# Patient Record
Sex: MALE | Race: WHITE
[De-identification: names, ages, dates, MRNs, and addresses within clinical notes are randomized per-mention and may not be internally consistent; named-entity substitution may affect disease eponyms.]

---

## 2020-02-12 ENCOUNTER — HOSPITAL ENCOUNTER (OUTPATIENT)
Dept: HOSPITAL 47 - RADMRIMAIN | Age: 75
Discharge: HOME | End: 2020-02-12
Attending: ORTHOPAEDIC SURGERY
Payer: MEDICARE

## 2020-02-12 DIAGNOSIS — M75.111: Primary | ICD-10-CM

## 2020-02-12 NOTE — MR
EXAMINATION TYPE: MR shoulder RT wo con

 

DATE OF EXAM: 2/12/2020 7:32 AM

 

COMPARISON: NONE

 

HISTORY: Right shoulder pain

 

TECHNIQUE: Multiplanar multispin echo imaging of the right shoulder was performed.

 

FINDINGS:

Rotator cuff : Full thickness partial tear involving the supraspinatus tendon just distal to the crit
ical zone. There is thickening and heterogeneity of the supraspinatus tendon compatible with chronic 
tendinopathy. Remaining constituents of the rotator cuff are unremarkable. Bursa: No bursal effusion 
or thickening is seen.

 

Musculature: There is no muscular tear, contusion, or atrophy.

 

Acromioclavicular joint : Moderate AC joint arthropathy. Lateral downsloping of the acromion with sub
acromial spurring resulting in impingement. Osseous structures : There are no fractures or regions of
 abnormal bone marrow signal intensity.

 

Long biceps tendon : The biceps tendon is normally situated within the bicipital groove. No complete 
or partial biceps tendon tear is present.

 

Glenohumeral Joint fluid : There is no glenohumeral joint effusion.

 

Cartilage and Bone : No focal hyaline cartilage defects are noted. No Hill-Sachs, reverse Hill-Sachs,
 or bony Bankart lesions are seen.

 

Labrum : Globular appearance inferior glenoid labrum may reflect tear.

 

OTHER FINDINGS : none

 

IMPRESSION:

 

1. Full thickness partial tear involving the supraspinatus tendon just distal to the critical zone. 

2. Lateral downsloping of the acromion with subacromial spurring resulting in impingement.

3.Globular appearance inferior glenoid labrum may reflect tear.

## 2020-03-04 NOTE — HP
HISTORY AND PHYSICAL



DATE OF SERVICE:

03/05/2020



Benji Green is a 74-year-old patient seen with progressive right shoulder pain.

We discussed options for treatment.  He elected to proceed with arthroscopy.  Consent

was obtained.  Medical clearance provided by Dr. Nava. Cardiac clearance by Dr. Amin.



PAST MEDICAL HISTORY:

Hypertension, hyperlipidemia, non-insulin-dependent diabetes, cardiovascular disease.



PAST SURGICAL HISTORY:

Coronary artery bypass surgery.



DAILY MEDICATIONS:

Atorvastatin, finasteride, Flomax, Glucophage, lisinopril, metoprolol.



ALLERGIES:

None.



SOCIAL HISTORY:

He denies current tobacco use.



PHYSICAL EVALUATION OF THE RIGHT SHOULDER:

Flexion 150 degrees, abduction 130 degrees, external rotation is 30 degrees with pain

and weakness.  Tenderness along the anterior lateral acromion rotator cuff insertion

site.  Impingement positive at 90 degrees.  Drop-arm sign is positive.  His distal

neurovascular exam is intact.



Radiographs of the right shoulder revealed a type 2 anterior acromion,

acromioclavicular joint osteoarthritis and cystic changes of the greater tuberosity.



Right shoulder MRI revealed rotator cuff tendon tear, acromioclavicular joint

osteoarthritis and impingement.



IMPRESSION:

1. Right shoulder impingement with rotator cuff tear.

2. Right shoulder acromioclavicular joint osteoarthritis.

3. Hypertension.

4. Hyperlipidemia.

5. Non-insulin-dependent diabetes.

6. Coronary artery disease.



PLAN:

Right shoulder arthroscopy with subacromial decompression, arthroscopic rotator cuff

repair, Tomasa procedure, biceps tenotomy and debridement.





MMODL / IJN: [id number] / Job#: [id number]

## 2020-03-05 ENCOUNTER — HOSPITAL ENCOUNTER (OUTPATIENT)
Dept: HOSPITAL 47 - OR | Age: 75
Discharge: HOME | End: 2020-03-05
Attending: ORTHOPAEDIC SURGERY
Payer: MEDICARE

## 2020-03-05 VITALS — TEMPERATURE: 96.8 F

## 2020-03-05 VITALS — HEART RATE: 63 BPM | DIASTOLIC BLOOD PRESSURE: 70 MMHG | SYSTOLIC BLOOD PRESSURE: 126 MMHG

## 2020-03-05 VITALS — BODY MASS INDEX: 27.1 KG/M2

## 2020-03-05 VITALS — RESPIRATION RATE: 16 BRPM

## 2020-03-05 DIAGNOSIS — Z79.1: ICD-10-CM

## 2020-03-05 DIAGNOSIS — M94.211: ICD-10-CM

## 2020-03-05 DIAGNOSIS — Z82.49: ICD-10-CM

## 2020-03-05 DIAGNOSIS — I25.10: ICD-10-CM

## 2020-03-05 DIAGNOSIS — K21.9: ICD-10-CM

## 2020-03-05 DIAGNOSIS — E66.3: ICD-10-CM

## 2020-03-05 DIAGNOSIS — M75.101: Primary | ICD-10-CM
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
## 2020-03-05 DIAGNOSIS — S46.111A: ICD-10-CM

## 2020-03-05 DIAGNOSIS — I10: ICD-10-CM

## 2020-03-05 DIAGNOSIS — R35.1: ICD-10-CM

## 2020-03-05 DIAGNOSIS — Z87.891: ICD-10-CM

## 2020-03-05 DIAGNOSIS — Z79.82: ICD-10-CM

## 2020-03-05 DIAGNOSIS — N40.0: ICD-10-CM

## 2020-03-05 DIAGNOSIS — E78.2: ICD-10-CM

## 2020-03-05 DIAGNOSIS — Z79.899: ICD-10-CM

## 2020-03-05 DIAGNOSIS — Z79.84: ICD-10-CM

## 2020-03-05 DIAGNOSIS — E11.9: ICD-10-CM

## 2020-03-05 DIAGNOSIS — R01.1: ICD-10-CM

## 2020-03-05 DIAGNOSIS — M19.011: ICD-10-CM

## 2020-03-05 DIAGNOSIS — M25.50: ICD-10-CM

## 2020-03-05 DIAGNOSIS — Z95.1: ICD-10-CM

## 2020-03-05 DIAGNOSIS — M75.41: ICD-10-CM

## 2020-03-05 LAB
GLUCOSE BLD-MCNC: 147 MG/DL (ref 75–99)
GLUCOSE BLD-MCNC: 164 MG/DL (ref 75–99)

## 2020-03-05 PROCEDURE — 29827 SHO ARTHRS SRG RT8TR CUF RPR: CPT

## 2020-03-05 PROCEDURE — 64415 NJX AA&/STRD BRCH PLXS IMG: CPT

## 2020-03-05 PROCEDURE — 29826 SHO ARTHRS SRG DECOMPRESSION: CPT

## 2020-03-05 PROCEDURE — 76942 ECHO GUIDE FOR BIOPSY: CPT

## 2020-03-05 PROCEDURE — 29824 SHO ARTHRS SRG DSTL CLAVICLC: CPT

## 2020-03-05 NOTE — P.OP
Date of Procedure: 03/05/20


Preoperative Diagnosis: 


Right shoulder impingement


Postoperative Diagnosis: 


1.  Right shoulder rotator cuff tear


2.  Right shoulder impingement


3.  Right shoulder acromioclavicular joint osteoarthritis


4.  Right shoulder partial long head biceps tendon tear





Procedure(s) Performed: 


1.  Right shoulder arthroscopic rotator cuff repair


2.  Right shoulder arthroscopic subacromial decompression


3.  Right shoulder arthroscopic Tomasa procedure


4.  Right shoulder arthroscopic biceps tenotomy





Implants: 


2Arthrex 4.75 swivel lock anchors


Anesthesia: GETA, regional (Interscalene block)


Surgeon: Pk Benoit


Assistant #1: Andrew Bowen


Estimated Blood Loss (ml): 11


Pathology: none sent


Condition: stable


Disposition: PACU


Indications for Procedure: 


74-year-old patient seen with progressive right shoulder pain.  After treatment 

options were discussed, he elected to proceed with arthroscopy.


Operative Findings: 


See description of procedure


Description of Procedure: 








 Patient underwent an interscalene block by department of anesthesia.  The 

patient was then taken to the operative suite.  The patient underwent a general 

anesthetic by the department of anesthesia.  The patient was placed into a 

lateral position and secured.  There was appropriate padding of the bony 

prominence.  Right shoulder was then prepped and draped in normal sterile 

orthopedic fashion.  We placed the extremity in 10 pounds of longitudinal 

traction. A posterior incision was now made for a posterior working portal site.

The trocar and cannula were inserted into the glenohumeral joint. Arthroscopy 

was initiated. Spinal needle was now inserted anteriorly, to ascertain the 

anterior working portal site.  An incision was now made in that area, a trocar 

was inserted followed by a probe.  There was partial tearing long head biceps 

tendon.  I could visualize rotator cuff tear from glenohumeral side.  The labrum

was found to be stable.  There were mild grade 1 chondromalacia changes of both 

the humeral head and glenoid fossa.  I performed an arthroscopic biceps 

tenotomy.  I again probe the labrum and it was found to be stable.  Instruments 

now removed from glenohumeral joint.


Utilizing the posterior working portal site, the trocar and cannula were 

inserted into the subacromial space. Arthroscopy initiated. I made an incision 2

fingerbreadths lateral to the acromion. I introduced my trocar followed by my 

ArthroCare ablator. I now began ablating thick subacromial bursal tissue, which 

exposed the undersurface of the anterior acromion.  There was diminished 

subacromial space. There was a very prominent anterior acromion. A motorized bur

was introduced and a subacromial decompression was performed. I also excised 

some osteophytes off the inferior aspect of the distal clavicle. The AC joint 

was visualized and noted to be fairly arthritic.  The motorized bur was 

introduced in the anterior portal site and a Tomasa procedure was performed 

without difficulty, decompressing the AC joint nicely. I turned my attention to 

the rotator cuff.  There was a 3 cm rotator cuff tendon tear along the distal 

supraspinatus with an extension intrasubstance component to a.  I debrided the 

margins getting down to stable tendon tissue I abraded the footprint with a 

motorized bur.  I now repaired the intrasubstance component with 3 [id number] 

sutures which repair that nicely.  I now passed 2 everted mattress sutures along

the anterior aspect of the tear and then 2 everted mattress sutures along the 

posterior aspect of the tear.  I now repaired the anterior cruciate the tear 

utilizing one 4.75 Arthrex swivel lock anchor which did compress that area 

nicely.  Received residual suture limbs were clipped.  I now repaired the 

posterior aspect of the tear again utilizing one 4.75 swivel lock anchor which 

compressed the tendon along the footprint very nicely as well.  All residual 

suture limbs were now clipped.  We had good compression of the tendon along the 

entire footprint.  I injected 1 mL Renyte intra-articular Instruments now 

removed from the portal sites. All portal sites were approximated with nylon 

suture. Sterile dressings were applied followed by a shoulder sling.  Andrew OCONNELL assisted in this complex case.  The patient was awakened, transferred 

to a bed, and taken to recovery in stable condition.

## 2020-03-05 NOTE — P.ANPRN
Procedure Note - Anesthesia





- Nerve Block Performed


  ** Right Interscalene Single


Time Out Performed: Yes (1003)


Date of Procedure: 03/05/20


Procedure Start Time: 10:04


Procedure Stop Time: 10:08


Location of Patient: PreOp


Indication: Acute Post-Operative Pain, Requested by Surgeon


Specifically requested for management of pain by DrLisa: Pk Benoit


Sedation Type: Sedate with meaningful contact maintained


Preparation: Sterile Prep


Position: Supine


Catheter: None


Needle Types: Pajunk


Needle Gauge: 21


Ultrasound used to visualize needle placement: Yes


Ultrasound used to observe medication spread: Yes


Injectate: Other (see comment) (Ropi 0.5% 15cc and Lido 2% with epi 15cc)


Blood Aspirated: No


Pain Paresthesia on Injection Noted: No


Resistance on Injection: Normal


Image Stored and Saved: Yes


Events: Uneventful and Well Tolerated

## 2020-06-28 ENCOUNTER — HOSPITAL ENCOUNTER (EMERGENCY)
Dept: HOSPITAL 47 - EC | Age: 75
Discharge: HOME | End: 2020-06-28
Payer: MEDICARE

## 2020-06-28 VITALS
RESPIRATION RATE: 18 BRPM | HEART RATE: 72 BPM | TEMPERATURE: 98 F | SYSTOLIC BLOOD PRESSURE: 129 MMHG | DIASTOLIC BLOOD PRESSURE: 73 MMHG

## 2020-06-28 DIAGNOSIS — Z87.891: ICD-10-CM

## 2020-06-28 DIAGNOSIS — E78.5: ICD-10-CM

## 2020-06-28 DIAGNOSIS — Z79.899: ICD-10-CM

## 2020-06-28 DIAGNOSIS — Z95.1: ICD-10-CM

## 2020-06-28 DIAGNOSIS — S61.411A: Primary | ICD-10-CM

## 2020-06-28 DIAGNOSIS — Z79.82: ICD-10-CM

## 2020-06-28 DIAGNOSIS — I10: ICD-10-CM

## 2020-06-28 DIAGNOSIS — Z23: ICD-10-CM

## 2020-06-28 DIAGNOSIS — I25.10: ICD-10-CM
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
## 2020-06-28 DIAGNOSIS — Z79.84: ICD-10-CM

## 2020-06-28 DIAGNOSIS — E11.9: ICD-10-CM

## 2020-06-28 PROCEDURE — 90715 TDAP VACCINE 7 YRS/> IM: CPT

## 2020-06-28 PROCEDURE — 90471 IMMUNIZATION ADMIN: CPT

## 2020-06-28 PROCEDURE — 99283 EMERGENCY DEPT VISIT LOW MDM: CPT

## 2020-06-28 PROCEDURE — 12001 RPR S/N/AX/GEN/TRNK 2.5CM/<: CPT

## 2020-06-28 PROCEDURE — 73130 X-RAY EXAM OF HAND: CPT

## 2020-06-28 NOTE — ED
Wound/Laceration HPI





- General


Source: patient


Mode of arrival: ambulatory


Limitations: no limitations





<Arlet Knowles - Last Filed: 06/29/20 00:09>





<Kae Stone - Last Filed: 07/02/20 17:33>





- General


Chief Complaint: Wound/Laceration


Stated Complaint: Rt Hand Laceration


Time Seen by Provider: 06/28/20 19:44





- History of Present Illness


Initial Comments: 





Patient is a 74-year-old male presenting to the emergency Department with 

complaints of a laceration to his right hand after a fall injury yesterday.  

Patient states she tripped and fell and put out his right hand to brace himself 

and ended up with a laceration to his hand near the distal end of the middle 

finger.  Patient states he also had a dislocated finger however he relocated 

himself last night.  He states he does not remember his last tetanus shot.  He 

states he did not hit his head, he has no other injuries from this fall.  He has

no further complaints at this time.  Upon arrival to the ER, his vitals are 

stable. (Arlet Knowles)





- Related Data


                                Home Medications











 Medication  Instructions  Recorded  Confirmed


 


Ezetimibe [Zetia] 10 mg PO HS 10/15/14 03/05/20


 


Lisinopril [Zestril] 10 mg PO QAM 10/15/14 03/05/20


 


Metoprolol Tartrate [Lopressor] 50 mg PO BID 10/15/14 03/05/20


 


Tamsulosin HCl [Flomax] 0.4 mg PO BID 10/15/14 03/05/20


 


metFORMIN HCL [Glucophage] 1,000 mg PO BID 10/15/14 03/05/20


 


Aspirin 81 mg PO HS 05/01/15 03/05/20


 


Ibuprofen [Motrin] 800 mg PO Q8H PRN 08/02/16 03/05/20


 


Atorvastatin [Lipitor] 80 mg PO HS 03/04/20 03/05/20


 


Canagliflozin [Invokana] 100 mg PO DAILY 03/04/20 03/05/20


 


Finasteride [Proscar] 5 mg PO DAILY 03/04/20 03/05/20








                                  Previous Rx's











 Medication  Instructions  Recorded


 


Hydrocodone/Acetaminophen [Norco 1 each PO Q6HR PRN #28 tab 03/05/20





5-325]  











                                    Allergies











Allergy/AdvReac Type Severity Reaction Status Date / Time


 


No Known Allergies Allergy   Verified 06/28/20 19:36














Review of Systems


ROS Other: All systems not noted in ROS Statement are negative.





<Arlet Knowles - Last Filed: 06/29/20 00:09>


ROS Other: All systems not noted in ROS Statement are negative.





<Kae Stone LOUISE - Last Filed: 07/02/20 17:33>


ROS Statement: 


Those systems with pertinent positive or pertinent negative responses have been 

documented in the HPI.








Past Medical History


Past Medical History: Coronary Artery Disease (CAD), Diabetes Mellitus, 

Hyperlipidemia, Hypertension, Osteoarthritis (OA), Prostate Disorder


Additional Past Medical History / Comment(s): rt shoulder pain,enlarged 

prostate,


History of Any Multi-Drug Resistant Organisms: None Reported


Past Surgical History: Coronary Bypass/CABG, Heart Catheterization


Additional Past Surgical History / Comment(s): TRIPLE CABG 2007,


Past Anesthesia/Blood Transfusion Reactions: No Reported Reaction


Additional Past Anesthesia/Blood Transfusion Reaction / Comment(s): unknown if 

had prior blood transfusion


Past Psychological History: No Psychological Hx Reported


Smoking Status: Former smoker


Past Alcohol Use History: None Reported


Past Drug Use History: None Reported





- Past Family History


  ** Mother


Family Medical History: No Reported History





<Arlet Knowles - Last Filed: 06/29/20 00:09>





General Exam


Limitations: no limitations





<Arlet Knowles - Last Filed: 06/29/20 00:09>





- General Exam Comments


Initial Comments: 





GENERAL: 


Well-appearing, well-nourished and in no acute distress.





HEAD: 


Atraumatic, normocephalic.





EYES:


Pupils equal round and reactive to light, extraocular movements intact, sclera 

anicteric, conjunctiva are normal.





ENT: 


TMs normal, nares patent, oropharynx clear without exudates.  Moist mucous 

membranes.





NECK: 


Normal range of motion, supple without lymphadenopathy or JVD.





LUNGS:


 Breath sounds clear to auscultation bilaterally and equal.  No wheezes rales or

rhonchi.





HEART:


Regular rate and rhythm without murmurs, rubs or gallops.





ABDOMEN: 


Soft, nontender, normoactive bowel sounds.  No guarding, no rebound.  No masses 

appreciated.





: Deferred 





EXTREMITIES: 


Patient has full range of motion of his right hand and fingers.  He does have 

some bruising noted to his palmar aspect.  He is neurovascular intact.  No 

clubbing or cyanosis.





NEUROLOGICAL: 


Normal speech, normal gait.





PSYCH:


Normal mood, normal affect.





SKIN:


 Warm, Dry, normal turgor, no rashes or lesions noted. (Arlet Knowles)





Course





                                   Vital Signs











  06/28/20





  19:33


 


Temperature 98.0 F


 


Pulse Rate 72


 


Respiratory 18





Rate 


 


Blood Pressure 129/73


 


O2 Sat by Pulse 96





Oximetry 














Procedures





- Laceration


  ** Laceration #1


Consent Obtained: verbal consent


Indication: laceration


Site: hand (Right hand, palmar aspect)


Size (cm): 2


Description: linear


Depth: simple, single layer


Anesthetic Used: lidocaine 1%


Anesthesia Technique: local infiltration


Amount (mls): 3


Pre-repair: irrigated extensively


Type of Sutures: nylon


Size of Sutures: 4-0


Number of Sutures: 3


Technique: simple, interrupted


Patient Tolerated Procedure: well





<Arlet Knowles - Last Filed: 06/29/20 00:09>





Medical Decision Making





<Arlet Knowles - Last Filed: 06/29/20 00:09>





<Kae Stone - Last Filed: 07/02/20 17:33>





- Medical Decision Making





Patient is a 74-year-old male presenting for a 2 cm laceration to his right 

hand, palmar aspect.  X-rays reveal no acute fractures/dislocations.  He did 

receive his tetanus vaccine today.  I closed the wound with 3, 4-0 sutures 

loosely since this did happen yesterday.  Patient tolerated procedure well.  He 

is stable for discharge.  He will have sutures removed in 7-10 days.  He is in 

agreement with this plan of care.  Case discussed with Dr. Stone.  

(Arlet Knowles)


I was available for consultation in the emergency department.  The history and 

physical exam were done by the midlevel provider. I was consulted for this 

patients care. I reviewed the case with the midlevel provider and based on 

their presentation of the patient, I agree with the assessment, medical decision

making and plan of care as documented. 


Chart was dictated using Dragon dictation software.  Attempts were made to 

correct any dictation errors however some typographical errors may persist. 


Patient was seen during a national state of emergency due to the Covid-19 

pandemic. 


 (Kae Stone)





Disposition


Is patient prescribed a controlled substance at d/c from ED?: No





<Arlet Knowles - Last Filed: 06/29/20 00:09>





<Kae Stone - Last Filed: 07/02/20 17:33>


Clinical Impression: 


 Contusion of right hand, Laceration of right hand





Disposition: HOME SELF-CARE


Condition: Stable


Instructions (If sedation given, give patient instructions):  Care For Your 

Stitches (ED)


Additional Instructions: 


Please return to the Emergency Department if symptoms worsen or any other 

concerns.


Keep wound clean and dry.


Sutures need to be removed in 7-10 days.


Use ice to the hand and ibuprofen for discomfort.


Referrals: 


Mekhi Nava DO [Primary Care Provider] - 1-2 days

## 2020-06-28 NOTE — XR
EXAMINATION TYPE: XR hand complete RT

 

DATE OF EXAM: 6/28/2020

 

COMPARISON: NONE

 

HISTORY: Laceration

 

TECHNIQUE: 3 views

 

FINDINGS: There is soft tissue swelling on the dorsum of the hand. I see no fracture nor dislocation.
 Metacarpals are intact.

 

IMPRESSION: Soft tissue swelling. No fracture seen.

## 2021-06-26 ENCOUNTER — HOSPITAL ENCOUNTER (EMERGENCY)
Dept: HOSPITAL 47 - EC | Age: 76
Discharge: HOME | End: 2021-06-26
Payer: COMMERCIAL

## 2021-06-26 VITALS
DIASTOLIC BLOOD PRESSURE: 82 MMHG | TEMPERATURE: 97.7 F | RESPIRATION RATE: 18 BRPM | HEART RATE: 62 BPM | SYSTOLIC BLOOD PRESSURE: 147 MMHG

## 2021-06-26 DIAGNOSIS — E11.9: ICD-10-CM

## 2021-06-26 DIAGNOSIS — Z79.899: ICD-10-CM

## 2021-06-26 DIAGNOSIS — Z87.891: ICD-10-CM

## 2021-06-26 DIAGNOSIS — M25.551: Primary | ICD-10-CM

## 2021-06-26 DIAGNOSIS — Z79.82: ICD-10-CM

## 2021-06-26 DIAGNOSIS — I25.10: ICD-10-CM

## 2021-06-26 DIAGNOSIS — E78.5: ICD-10-CM

## 2021-06-26 DIAGNOSIS — M19.90: ICD-10-CM

## 2021-06-26 DIAGNOSIS — I10: ICD-10-CM

## 2021-06-26 DIAGNOSIS — Z95.1: ICD-10-CM

## 2021-06-26 DIAGNOSIS — Z79.84: ICD-10-CM

## 2021-06-26 PROCEDURE — 73502 X-RAY EXAM HIP UNI 2-3 VIEWS: CPT

## 2021-06-26 PROCEDURE — 99283 EMERGENCY DEPT VISIT LOW MDM: CPT

## 2021-06-26 NOTE — XR
EXAMINATION TYPE: XR Hip Complete RT

 

DATE OF EXAM: 6/26/2021

 

CLINICAL HISTORY: pain

 

TECHNIQUE:  AP and frogleg views of the right hip are obtained.

 

COMPARISON: None.

 

FINDINGS:  There is no acute fracture/dislocation evident.  The joint space  appears within normal li
mits.  The overlying soft tissue appears unremarkable.

 

IMPRESSION:  

1.  There is no acute fracture or dislocation.

 

ICD 10 NO FRACTURE, INITIAL EVALUATION

## 2021-06-26 NOTE — ED
Lower Extremity Injury HPI





- General


Chief Complaint: Extremity Injury, Lower


Stated Complaint: Hip pain


Time Seen by Provider: 06/26/21 10:00


Source: patient


Mode of arrival: wheelchair


Limitations: no limitations





- History of Present Illness


Initial Comments: 





Patient is a 75-year-old male presenting to the emergency Department with 

complaints of right hip pain over the past few months.  He states he did make an

appointment with his orthopedic physician , Iman for a few more weeks and 

he can no longer take the pain.  He denies any trauma or falls.  He denies any 

previous surgeries of the right hip.  He states this does been progressively 

getting worse.  He denies any surgeries of the entire right lower extremity.  He

denies any fevers or chills.  He has no further complaints.





- Related Data


                                Home Medications











 Medication  Instructions  Recorded  Confirmed


 


Ezetimibe [Zetia] 10 mg PO HS 10/15/14 03/05/20


 


Lisinopril [Zestril] 10 mg PO QAM 10/15/14 03/05/20


 


Metoprolol Tartrate [Lopressor] 50 mg PO BID 10/15/14 03/05/20


 


Tamsulosin HCl [Flomax] 0.4 mg PO BID 10/15/14 03/05/20


 


metFORMIN HCL [Glucophage] 1,000 mg PO BID 10/15/14 03/05/20


 


Aspirin 81 mg PO HS 05/01/15 03/05/20


 


Ibuprofen [Motrin] 800 mg PO Q8H PRN 08/02/16 03/05/20


 


Atorvastatin [Lipitor] 80 mg PO HS 03/04/20 03/05/20


 


Canagliflozin [Invokana] 100 mg PO DAILY 03/04/20 03/05/20


 


Finasteride [Proscar] 5 mg PO DAILY 03/04/20 03/05/20








                                  Previous Rx's











 Medication  Instructions  Recorded


 


Hydrocodone/Acetaminophen [Norco 1 each PO Q6HR PRN #28 tab 03/05/20





5-325]  











                                    Allergies











Allergy/AdvReac Type Severity Reaction Status Date / Time


 


No Known Allergies Allergy   Verified 06/26/21 09:44














Review of Systems


ROS Statement: 


Those systems with pertinent positive or pertinent negative responses have been 

documented in the HPI.





ROS Other: All systems not noted in ROS Statement are negative.





Past Medical History


Past Medical History: Coronary Artery Disease (CAD), Diabetes Mellitus, 

Hyperlipidemia, Hypertension, Osteoarthritis (OA), Prostate Disorder


Additional Past Medical History / Comment(s): rt shoulder pain,enlarged prostate

,


History of Any Multi-Drug Resistant Organisms: None Reported


Past Surgical History: Coronary Bypass/CABG, Heart Catheterization


Additional Past Surgical History / Comment(s): TRIPLE CABG 2007,


Past Anesthesia/Blood Transfusion Reactions: No Reported Reaction


Additional Past Anesthesia/Blood Transfusion Reaction / Comment(s): unknown if 

had prior blood transfusion


Past Psychological History: No Psychological Hx Reported


Smoking Status: Former smoker


Past Alcohol Use History: None Reported


Past Drug Use History: None Reported





- Past Family History


  ** Mother


Family Medical History: No Reported History





General Exam





- General Exam Comments


Initial Comments: 





GENERAL: 


Patient is well-developed and well-nourished.  Patient is nontoxic and in no 

acute distress.





HEAD: 


Atraumatic, normocephalic.





EYES:


Pupils equal round and reactive to light, extraocular movements intact, sclera 

anicteric, conjunctiva are normal.  Eyelids were unremarkable.





ENT: 


Nares patent, oropharynx clear without exudates.  Moist mucous membranes.





NECK: 


Normal range of motion, supple without lymphadenopathy or JVD.





LUNGS:


Unlabored respirations.  Breath sounds clear to auscultation bilaterally and 

equal.  No wheezes rales or rhonchi.





HEART:


Regular rate and rhythm without murmurs, rubs or gallops.





ABDOMEN: 


Soft, nontender, normoactive bowel sounds.  No guarding, no rebound.  No masses 

appreciated.





: Deferred 





MUSCULOSKELETAL: 


Patient has increased right hip pain with hip flexion, with resisted hip flexion

as well.  He is neurovascular intact.   No clubbing or cyanosis.





NEUROLOGICAL: 


Patient is alert and oriented x 3.  Motor and sensory are also intact.  Cranial 

nerves II through XII grossly intact.  Symmetrical smile.  Normal speech, normal

gait.   





PSYCH:


Normal mood, normal affect.





SKIN:


 Warm, Dry, normal turgor, no rashes or lesions noted.


Limitations: no limitations





Course


                                   Vital Signs











  06/26/21





  09:41


 


Temperature 97.7 F


 


Pulse Rate 62


 


Respiratory 18





Rate 


 


Blood Pressure 147/82


 


O2 Sat by Pulse 98





Oximetry 














Medical Decision Making





- Medical Decision Making





Patient is a 75-year-old male here with right hip pain increasing over the past 

few months.  No falls or trauma, no previous surgeries.  So no acute fractures 

dislocations.  I discussed with patient this could be an increase of some 

inflammation, possible arthritis.  I recommended ibuprofen, he states he has 

been taking this without improvement.  I will give him a few Tylenol 3's to go 

home with.  He can follow up with his orthopedic doctor.  He is in agreement 

with this plan of care and he is stable for discharge.  Case discussed with Dr. Turcios.





Disposition


Clinical Impression: 


 Right hip pain





Disposition: HOME SELF-CARE


Condition: Stable


Instructions (If sedation given, give patient instructions):  Hip Pain (ED)


Additional Instructions: 


Please return to the Emergency Department if symptoms worsen or any other 

concerns.


Recommend ibuprofen for discomfort, if pain continues, may take a Tylenol #3 at 

nighttime.


Follow-up with your orthopedic doctor.


Is patient prescribed a controlled substance at d/c from ED?: No


Referrals: 


Mekhi Nava DO [Primary Care Provider] - 1-2 days


Pk Benoit DO [Doctor of Osteopathic Medicine] - 1-2 days


Time of Disposition: 11:00

## 2021-08-13 ENCOUNTER — HOSPITAL ENCOUNTER (OUTPATIENT)
Dept: HOSPITAL 47 - LABPAT | Age: 76
Discharge: HOME | End: 2021-08-13
Attending: ORTHOPAEDIC SURGERY
Payer: MEDICARE

## 2021-08-13 DIAGNOSIS — Z01.812: Primary | ICD-10-CM

## 2021-08-13 DIAGNOSIS — M16.11: ICD-10-CM

## 2021-08-13 PROCEDURE — 87070 CULTURE OTHR SPECIMN AEROBIC: CPT

## 2021-08-22 NOTE — HP
HISTORY AND PHYSICAL



DATE OF SURGERY:

08/23/2021



Benji Green is a 75-year-old gentleman seen with symptomatic right hip

osteoarthritis.  We discussed options for treatment.  He elected to proceed with right

total hip arthroplasty. Consent regarding the procedure was obtained.  Medical

clearance was provided by Dr. Nava. Cardiac clearance was provided by Dr. Amin.



PAST MEDICAL HISTORY:

Hyperlipidemia, hypertension, non-insulin-dependent diabetes, cardiovascular disease.



PAST SURGICAL HISTORY:

Coronary artery bypass surgery.



DAILY MEDICATIONS:

Atorvastatin, finasteride, aspirin, Glucophage, lisinopril, metoprolol, Flomax.



ALLERGIES:

NONE.



SOCIAL HISTORY:

He denies tobacco use.



PHYSICAL EVALUATION OF THE RIGHT HIP:

Physical evaluation of the right hip shows very limited range of motion, severe pain.

Positive hip impingement sign.  Straight-leg raise is negative.  His distal

neurovascular exam is intact.



RADIOGRAPHS:

Radiographs of the right hip reveal severe osteoarthritic changes.



IMPRESSION:

1. Right hip osteoarthritis.

2. Hypertension.

3. Hyperlipidemia.

4. Non-insulin-dependent diabetes.

5. Coronary artery disease.



PLAN:

Direct anterior right total hip arthroplasty.





MMODL / IJN: [id number] / Job#: [id number]

## 2021-08-23 ENCOUNTER — HOSPITAL ENCOUNTER (OUTPATIENT)
Dept: HOSPITAL 47 - OR | Age: 76
Discharge: HOME HEALTH | End: 2021-08-23
Attending: ORTHOPAEDIC SURGERY
Payer: MEDICARE

## 2021-08-23 VITALS — TEMPERATURE: 96.8 F

## 2021-08-23 VITALS — RESPIRATION RATE: 16 BRPM

## 2021-08-23 VITALS — SYSTOLIC BLOOD PRESSURE: 132 MMHG | HEART RATE: 85 BPM | DIASTOLIC BLOOD PRESSURE: 73 MMHG

## 2021-08-23 VITALS — BODY MASS INDEX: 27.1 KG/M2

## 2021-08-23 DIAGNOSIS — Z79.899: ICD-10-CM

## 2021-08-23 DIAGNOSIS — N40.0: ICD-10-CM

## 2021-08-23 DIAGNOSIS — E78.5: ICD-10-CM

## 2021-08-23 DIAGNOSIS — Z79.82: ICD-10-CM

## 2021-08-23 DIAGNOSIS — M16.11: Primary | ICD-10-CM

## 2021-08-23 DIAGNOSIS — E11.9: ICD-10-CM

## 2021-08-23 DIAGNOSIS — Z79.84: ICD-10-CM

## 2021-08-23 DIAGNOSIS — I25.10: ICD-10-CM

## 2021-08-23 DIAGNOSIS — I10: ICD-10-CM

## 2021-08-23 DIAGNOSIS — Z95.1: ICD-10-CM

## 2021-08-23 LAB
GLUCOSE BLD-MCNC: 171 MG/DL (ref 75–99)
GLUCOSE BLD-MCNC: 200 MG/DL (ref 75–99)
GLUCOSE BLD-MCNC: 215 MG/DL (ref 75–99)
GLUCOSE BLD-MCNC: 227 MG/DL (ref 75–99)

## 2021-08-23 PROCEDURE — 88300 SURGICAL PATH GROSS: CPT

## 2021-08-23 PROCEDURE — 97110 THERAPEUTIC EXERCISES: CPT

## 2021-08-23 PROCEDURE — 73501 X-RAY EXAM HIP UNI 1 VIEW: CPT

## 2021-08-23 PROCEDURE — 86900 BLOOD TYPING SEROLOGIC ABO: CPT

## 2021-08-23 PROCEDURE — 97161 PT EVAL LOW COMPLEX 20 MIN: CPT

## 2021-08-23 PROCEDURE — 86901 BLOOD TYPING SEROLOGIC RH(D): CPT

## 2021-08-23 PROCEDURE — 86850 RBC ANTIBODY SCREEN: CPT

## 2021-08-23 PROCEDURE — 27130 TOTAL HIP ARTHROPLASTY: CPT

## 2021-08-23 RX ADMIN — Medication PRN ML: at 10:23

## 2021-08-23 RX ADMIN — Medication PRN ML: at 11:07

## 2021-08-23 NOTE — P.OP
Date of Procedure: 08/23/21


Preoperative Diagnosis: 


Right hip osteoarthritis


Postoperative Diagnosis: 


Right hip osteoarthritis


Procedure(s) Performed: 


Direct anterior right total hip arthroplasty


Implants: 


1.  Depuy Corail KA size 12 with collar press-fit femoral stem


2.  Depuy pinnacle 60 mm multi hole press-fit acetabular shell


3.  Depuy pinnacle neutral polyethylene acetabular liner 60 mm OD 36 mm ID


4.  Biolox delta ceramic femoral head +1.5 36 mm


Anesthesia: GETA, local


Surgeon: Pk Benoit


Assistant #1: Andrew Bowen


Estimated Blood Loss (ml): 85


Pathology: other (Femoral head)


Condition: stable


Disposition: PACU


Indications for Procedure: 


75-year-old patient seen with symptomatic right hip osteoarthritis.  After 

treatment options were discussed, he elected to proceed with direct anterior 

right total hip arthroplasty.


Operative Findings: 


See description of procedure


Description of Procedure: 


The patient was taken to the operative suite.  Patient underwent a general 

anesthetic by the department of anesthesia.  Patient was then transferred to the

[place] table.  Patient was given preoperative IV antibiotics and TXA.  Both lower

extremities were placed in standard leg spars.  The hip was then prepped and 

draped in the normal sterile orthopedic fashion.  A standard anterior incision 

was made beginning 3 cm lateral and 1 cm distal to the ASIS extending 10 cm.  

Dissection was then carried down through the subcutaneous soft tissues down to 

the fascia overlying the tensor fascia jonatan.  An incision was now made through 

the fascia.  Careful dissection was taken down exposing the tensor fascia jonatan 

muscle.  A Cobra retractor was now placed along the medial femoral neck and a 

second one along the lateral femoral neck.  The venous circumflex vessels were 

now identified, cauterized and clipped.  We identified the anterior hip capsule.

 An incision was made through the hip capsule along the lateral border.  I 

performed a partial anterior capsulectomy.  Retractors were now placed around 

the femoral neck itself. A femoral neck cut was now made with a sagittal saw.  

It was completed with an osteotome at the lateral neck area.  The femoral head 

was now removed without difficulty.  The extremity was now rotated to 60 of 

external rotation.  It was locked in position.  Residual labrum was now debrided

out.  Serial reaming was performed of the acetabulum while Charles OCONNELL 

assisted holding an anterior retractor for exposure.  Once we reached the 

appropriate size and a trial was position and fit nicely.  The appropriate size 

was now chosen opened and made available.  It was introduced into the acetabulum

without difficulty.  The C-arm/fluoroscopy was now brought into the operative 

field.  We made sure we had a true AP pelvic view.  We now under direct 

C-arm/fluoroscopy introduced into the acetabular component with appropriate 

version and inclination.  I held the cup in appropriate position well Charles OCONNELL used a mallet to seat the acetabular component.  I noted the component

now to be well seated and stable.  Acetabular cup introduce her was removed.  

The C-arm was pulled back.  An appropriate liner was introduced and clicked into

position.  It was felt to be stable.  At this point retractors were removed.  

The extremity was now placed into 140 external rotation with no traction.  The 

leg was now dropped to the ground and adducted.  Appropriate retractors were now

positioned along the proximal femur.  We also placed our femoral look into 

position.  Additional capsular releasing was performed to gain access to the 

proximal femur.  We now used a box osteotome.  A canal finder was now utilized. 

Serial broaching was now performed with the assistance of Charles OCONNELL tapping

the broaches down with a mallet while held the broach in appropriate rotation 

and position.  This was done  until we reached the appropriate size with good 

overall rotational stability.  Appropriate calcar planing was performed.  A 

trial head/neck was placed into position.  The hip was now reduced.  The C-

arm/fluoroscopy was brought back into the operative field.  I obtained an AP 

pelvis demonstrating adequate leg length positioning.  I evaluated the trial 

components in the appeared be appropriately sized in position.  The C-

arm/fluoroscopy was pulled back.  Retractors were repositioned and the hip was 

dislocated.  The leg was again taken down to the ground and adducted.  

Appropriate retractors were repositioned as well as the femoral hook.  All trial

components were removed.  The femoral implant was opened along with the femoral 

head.  The femoral implant was introduced on the appropriate handle into our 

pre-broached area.  I held the component position well Charles OCONNELL used a 

mallet to seat the femoral component.  The femoral component was now noted to be

well seated and stable..  The femoral head was introduced with good positioning 

and fixation noted.  Retractors were now removed.  The hip was now reduced.  

There appeared be good positioning of the hip confirmed on intraoperative 

fluoroscopy.  Spot films were obtained to document this.  A second gram of TXA 

was given.  The deep and superficial soft tissues were infiltrated with local a

nalgesic.  Bipolar cautery had been utilized intermittently through the 

procedure for hemostasis.  The wound was irrigated copiously with pulse lavage 

mechanical irrigation.  The fascia was repaired with Vicryl suture.  The 

subcutaneous soft tissues were repaired in layers with Vicryl suture.  The skin 

was approximated with pernio/Dermabond. Sterile dressings were applied.  Patient

was then awakened, transferred to a bed and taken to recovery in stable 

condition.  Charles OCONNELL assisted with the complex procedure.

## 2021-08-24 NOTE — XR
EXAMINATION TYPE: XR Hip Limited RT

 

DATE OF EXAM: 8/23/2021

 

COMPARISON: NONE

 

HISTORY: Postop

 

TECHNIQUE: One view submitted.

 

FINDINGS:

There is postsurgical change in near anatomic alignment.  There is soft tissue edema and emphysema. 

 

IMPRESSION:

1. Postoperative change.  Appears in near-anatomic alignment.

## 2022-11-14 ENCOUNTER — HOSPITAL ENCOUNTER (EMERGENCY)
Dept: HOSPITAL 47 - EC | Age: 77
Discharge: HOME | End: 2022-11-14
Payer: MEDICARE

## 2022-11-14 VITALS — SYSTOLIC BLOOD PRESSURE: 153 MMHG | RESPIRATION RATE: 15 BRPM | DIASTOLIC BLOOD PRESSURE: 92 MMHG

## 2022-11-14 VITALS — HEART RATE: 69 BPM | TEMPERATURE: 97.7 F

## 2022-11-14 DIAGNOSIS — N20.0: ICD-10-CM

## 2022-11-14 DIAGNOSIS — K46.9: Primary | ICD-10-CM

## 2022-11-14 DIAGNOSIS — I11.9: ICD-10-CM

## 2022-11-14 DIAGNOSIS — Z79.84: ICD-10-CM

## 2022-11-14 DIAGNOSIS — Z87.891: ICD-10-CM

## 2022-11-14 DIAGNOSIS — E11.9: ICD-10-CM

## 2022-11-14 DIAGNOSIS — M19.90: ICD-10-CM

## 2022-11-14 DIAGNOSIS — E78.5: ICD-10-CM

## 2022-11-14 DIAGNOSIS — Z79.899: ICD-10-CM

## 2022-11-14 DIAGNOSIS — Z79.82: ICD-10-CM

## 2022-11-14 DIAGNOSIS — I25.10: ICD-10-CM

## 2022-11-14 LAB
ALBUMIN SERPL-MCNC: 4.1 G/DL (ref 3.5–5)
ALP SERPL-CCNC: 49 U/L (ref 38–126)
ALT SERPL-CCNC: 34 U/L (ref 4–49)
AMYLASE SERPL-CCNC: 56 U/L (ref 30–110)
ANION GAP SERPL CALC-SCNC: 8 MMOL/L
AST SERPL-CCNC: 31 U/L (ref 17–59)
BASOPHILS # BLD AUTO: 0 K/UL (ref 0–0.2)
BASOPHILS NFR BLD AUTO: 1 %
BUN SERPL-SCNC: 18 MG/DL (ref 9–20)
CALCIUM SPEC-MCNC: 9.1 MG/DL (ref 8.4–10.2)
CHLORIDE SERPL-SCNC: 107 MMOL/L (ref 98–107)
CO2 SERPL-SCNC: 27 MMOL/L (ref 22–30)
EOSINOPHIL # BLD AUTO: 0.2 K/UL (ref 0–0.7)
EOSINOPHIL NFR BLD AUTO: 3 %
ERYTHROCYTE [DISTWIDTH] IN BLOOD BY AUTOMATED COUNT: 4.65 M/UL (ref 4.3–5.9)
ERYTHROCYTE [DISTWIDTH] IN BLOOD: 12.2 % (ref 11.5–15.5)
GLUCOSE SERPL-MCNC: 125 MG/DL (ref 74–99)
GLUCOSE UR QL: (no result)
HCT VFR BLD AUTO: 42 % (ref 39–53)
HGB BLD-MCNC: 14.5 GM/DL (ref 13–17.5)
LIPASE SERPL-CCNC: 315 U/L (ref 23–300)
LYMPHOCYTES # SPEC AUTO: 1.8 K/UL (ref 1–4.8)
LYMPHOCYTES NFR SPEC AUTO: 32 %
MCH RBC QN AUTO: 31.2 PG (ref 25–35)
MCHC RBC AUTO-ENTMCNC: 34.5 G/DL (ref 31–37)
MCV RBC AUTO: 90.4 FL (ref 80–100)
MONOCYTES # BLD AUTO: 0.3 K/UL (ref 0–1)
MONOCYTES NFR BLD AUTO: 5 %
NEUTROPHILS # BLD AUTO: 3.1 K/UL (ref 1.3–7.7)
NEUTROPHILS NFR BLD AUTO: 56 %
PH UR: 6.5 [PH] (ref 5–8)
PLATELET # BLD AUTO: 108 K/UL (ref 150–450)
POTASSIUM SERPL-SCNC: 4.1 MMOL/L (ref 3.5–5.1)
PROT SERPL-MCNC: 6.2 G/DL (ref 6.3–8.2)
SODIUM SERPL-SCNC: 142 MMOL/L (ref 137–145)
SP GR UR: 1.03 (ref 1–1.03)
UROBILINOGEN UR QL STRIP: <2 MG/DL (ref ?–2)
WBC # BLD AUTO: 5.6 K/UL (ref 3.8–10.6)

## 2022-11-14 PROCEDURE — 83605 ASSAY OF LACTIC ACID: CPT

## 2022-11-14 PROCEDURE — 99284 EMERGENCY DEPT VISIT MOD MDM: CPT

## 2022-11-14 PROCEDURE — 83690 ASSAY OF LIPASE: CPT

## 2022-11-14 PROCEDURE — 96361 HYDRATE IV INFUSION ADD-ON: CPT

## 2022-11-14 PROCEDURE — 74177 CT ABD & PELVIS W/CONTRAST: CPT

## 2022-11-14 PROCEDURE — 36415 COLL VENOUS BLD VENIPUNCTURE: CPT

## 2022-11-14 PROCEDURE — 81003 URINALYSIS AUTO W/O SCOPE: CPT

## 2022-11-14 PROCEDURE — 85025 COMPLETE CBC W/AUTO DIFF WBC: CPT

## 2022-11-14 PROCEDURE — 82150 ASSAY OF AMYLASE: CPT

## 2022-11-14 PROCEDURE — 96374 THER/PROPH/DIAG INJ IV PUSH: CPT

## 2022-11-14 PROCEDURE — 80053 COMPREHEN METABOLIC PANEL: CPT

## 2022-11-14 NOTE — ED
Back Pain HPI





- General


Chief Complaint: Back Pain/Injury


Stated Complaint: LT flank pain


Time Seen by Provider: 11/14/22 06:30


Source: patient, RN notes reviewed


Limitations: no limitations





- History of Present Illness


Initial Comments: 


Patient is a 77 year old male presenting to the ER with a chief complaint of 

left flank pain. Patient states this has been occurring for a couple of months. 

Patients describes it as a achy pain located about left CVA. He reports the pain

is a 6/10 when sitting up and is worse when laying down rating it a 10/10. 

Patient states today the pain has been progressively getting worse prompting his

visit for evaluation. He denies recent injuries/or hx back surgery. Denies 

abdominal pain, dysuria, increase in urinary frequency, change in bowel habits, 

fevers, chills, nightsweats. 








- Related Data


                                Home Medications











 Medication  Instructions  Recorded  Confirmed


 


Ezetimibe [Zetia] 10 mg PO DAILY 10/15/14 08/23/21


 


Metoprolol Tartrate [Lopressor] 50 mg PO BID 10/15/14 08/23/21


 


Tamsulosin HCl [Flomax] 0.4 mg PO BID 10/15/14 08/23/21


 


lisinopriL [Zestril] 10 mg PO QAM 10/15/14 08/23/21


 


metFORMIN HCL [Glucophage] 1,000 mg PO BID 10/15/14 08/23/21


 


Aspirin 81 mg PO HS 05/01/15 08/23/21


 


Ibuprofen [Motrin] 800 mg PO Q8H PRN 08/02/16 08/23/21


 


Atorvastatin [Lipitor] 80 mg PO DAILY 03/04/20 08/23/21


 


Canagliflozin [Invokana] 100 mg PO HS 03/04/20 08/23/21


 


Finasteride [Proscar] 5 mg PO DAILY 03/04/20 08/23/21


 


Acetaminophen [Tylenol Arthritis] 650 mg PO BID 08/18/21 08/23/21


 


Acetaminophen-Codeine 300-30mg 1 tab PO BID 08/18/21 08/23/21





[Tylenol w/codeine #3]   


 


Calcium Carbonate [Tums Ultra 470 mg PO AS DIRECTED PRN 08/18/21 08/23/21





Strength]   








                                  Previous Rx's











 Medication  Instructions  Recorded


 


Aspirin [Adult Low Dose Aspirin EC] 81 mg PO BID #60 tablet. 08/23/21


 


Docusate [Colace] 100 mg PO DAILY #30 capsule 08/23/21


 


HYDROcodone/APAP 5-325MG [Norco 1 tab PO Q6HR PRN #28 tab 08/23/21





5-325]  


 


Ketorolac [Toradol] 10 mg PO Q8HR #15 tab 11/14/22











                                    Allergies











Allergy/AdvReac Type Severity Reaction Status Date / Time


 


No Known Allergies Allergy   Verified 11/14/22 06:26














Review of Systems


ROS Statement: 


Those systems with pertinent positive or pertinent negative responses have been 

documented in the HPI.





ROS Other: All systems not noted in ROS Statement are negative.





Past Medical History


Past Medical History: Coronary Artery Disease (CAD), Diabetes Mellitus, 

Hyperlipidemia, Hypertension, Osteoarthritis (OA), Prostate Disorder


Additional Past Medical History / Comment(s): rt shoulder pain,enlarged 

prostate,


History of Any Multi-Drug Resistant Organisms: None Reported


Past Surgical History: Coronary Bypass/CABG, Heart Catheterization


Additional Past Surgical History / Comment(s): TRIPLE CABG 2007,


Past Anesthesia/Blood Transfusion Reactions: No Reported Reaction


Additional Past Anesthesia/Blood Transfusion Reaction / Comment(s): unknown if 

had prior blood transfusion


Past Psychological History: No Psychological Hx Reported


Smoking Status: Former smoker


Past Alcohol Use History: None Reported


Past Drug Use History: None Reported





- Past Family History


  ** Mother


Family Medical History: Cancer





General Exam


Limitations: no limitations


General appearance: alert, in no apparent distress


Respiratory exam: Present: normal lung sounds bilaterally.  Absent: respiratory 

distress, wheezes, rales, rhonchi, stridor


Cardiovascular Exam: Present: regular rate, normal rhythm, normal heart sounds. 

 Absent: systolic murmur, diastolic murmur, rubs, gallop, clicks


GI/Abdominal exam: Present: soft, normal bowel sounds.  Absent: distended, 

tenderness, guarding, rebound, rigid


Back exam: Present: normal inspection


Neurological exam: Present: alert, oriented X3, CN II-XII intact


Psychiatric exam: Present: normal affect, normal mood


Skin exam: Present: warm, dry, intact, normal color.  Absent: rash





Course


                                   Vital Signs











  11/14/22





  06:26


 


Temperature 97.7 F


 


Pulse Rate 69


 


Respiratory 16





Rate 


 


Blood Pressure 188/93


 


O2 Sat by Pulse 98





Oximetry 














Medical Decision Making





- Medical Decision Making


77-year-old male presented from for sided back pain.  Labs and CT were obtained 

CT was read by me and shows evidence of internal hernia, nephrolithiasis.  There

 is no other acute processes.  Patient does feel improved after Toradol.  

Patient will be discharged in stable condition will follow-up return parameters 

discussed.





- Lab Data


Result diagrams: 


                                 11/14/22 06:55





                                 11/14/22 06:55


                                   Lab Results











  11/14/22 11/14/22 11/14/22 Range/Units





  06:55 06:55 06:55 


 


WBC  5.6    (3.8-10.6)  k/uL


 


RBC  4.65    (4.30-5.90)  m/uL


 


Hgb  14.5    (13.0-17.5)  gm/dL


 


Hct  42.0    (39.0-53.0)  %


 


MCV  90.4    (80.0-100.0)  fL


 


MCH  31.2    (25.0-35.0)  pg


 


MCHC  34.5    (31.0-37.0)  g/dL


 


RDW  12.2    (11.5-15.5)  %


 


Plt Count  108 L    (150-450)  k/uL


 


MPV  8.3    


 


Neutrophils %  56    %


 


Lymphocytes %  32    %


 


Monocytes %  5    %


 


Eosinophils %  3    %


 


Basophils %  1    %


 


Neutrophils #  3.1    (1.3-7.7)  k/uL


 


Lymphocytes #  1.8    (1.0-4.8)  k/uL


 


Monocytes #  0.3    (0-1.0)  k/uL


 


Eosinophils #  0.2    (0-0.7)  k/uL


 


Basophils #  0.0    (0-0.2)  k/uL


 


Sodium   142   (137-145)  mmol/L


 


Potassium   4.1   (3.5-5.1)  mmol/L


 


Chloride   107   ()  mmol/L


 


Carbon Dioxide   27   (22-30)  mmol/L


 


Anion Gap   8   mmol/L


 


BUN   18   (9-20)  mg/dL


 


Creatinine   0.87   (0.66-1.25)  mg/dL


 


Est GFR (CKD-EPI)AfAm   >90   (>60 ml/min/1.73 sqM)  


 


Est GFR (CKD-EPI)NonAf   83   (>60 ml/min/1.73 sqM)  


 


Glucose   125 H   (74-99)  mg/dL


 


Plasma Lactic Acid Juice    1.2  (0.7-2.0)  mmol/L


 


Calcium   9.1   (8.4-10.2)  mg/dL


 


Total Bilirubin   0.8   (0.2-1.3)  mg/dL


 


AST   31   (17-59)  U/L


 


ALT   34   (4-49)  U/L


 


Alkaline Phosphatase   49   ()  U/L


 


Total Protein   6.2 L   (6.3-8.2)  g/dL


 


Albumin   4.1   (3.5-5.0)  g/dL


 


Amylase   56   ()  U/L


 


Lipase   315 H   ()  U/L


 


Urine Color     


 


Urine Appearance     (Clear)  


 


Urine pH     (5.0-8.0)  


 


Ur Specific Gravity     (1.001-1.035)  


 


Urine Protein     (Negative)  


 


Urine Glucose (UA)     (Negative)  


 


Urine Ketones     (Negative)  


 


Urine Blood     (Negative)  


 


Urine Nitrite     (Negative)  


 


Urine Bilirubin     (Negative)  


 


Urine Urobilinogen     (<2.0)  mg/dL


 


Ur Leukocyte Esterase     (Negative)  














  11/14/22 Range/Units





  07:03 


 


WBC   (3.8-10.6)  k/uL


 


RBC   (4.30-5.90)  m/uL


 


Hgb   (13.0-17.5)  gm/dL


 


Hct   (39.0-53.0)  %


 


MCV   (80.0-100.0)  fL


 


MCH   (25.0-35.0)  pg


 


MCHC   (31.0-37.0)  g/dL


 


RDW   (11.5-15.5)  %


 


Plt Count   (150-450)  k/uL


 


MPV   


 


Neutrophils %   %


 


Lymphocytes %   %


 


Monocytes %   %


 


Eosinophils %   %


 


Basophils %   %


 


Neutrophils #   (1.3-7.7)  k/uL


 


Lymphocytes #   (1.0-4.8)  k/uL


 


Monocytes #   (0-1.0)  k/uL


 


Eosinophils #   (0-0.7)  k/uL


 


Basophils #   (0-0.2)  k/uL


 


Sodium   (137-145)  mmol/L


 


Potassium   (3.5-5.1)  mmol/L


 


Chloride   ()  mmol/L


 


Carbon Dioxide   (22-30)  mmol/L


 


Anion Gap   mmol/L


 


BUN   (9-20)  mg/dL


 


Creatinine   (0.66-1.25)  mg/dL


 


Est GFR (CKD-EPI)AfAm   (>60 ml/min/1.73 sqM)  


 


Est GFR (CKD-EPI)NonAf   (>60 ml/min/1.73 sqM)  


 


Glucose   (74-99)  mg/dL


 


Plasma Lactic Acid Juice   (0.7-2.0)  mmol/L


 


Calcium   (8.4-10.2)  mg/dL


 


Total Bilirubin   (0.2-1.3)  mg/dL


 


AST   (17-59)  U/L


 


ALT   (4-49)  U/L


 


Alkaline Phosphatase   ()  U/L


 


Total Protein   (6.3-8.2)  g/dL


 


Albumin   (3.5-5.0)  g/dL


 


Amylase   ()  U/L


 


Lipase   ()  U/L


 


Urine Color  Yellow  


 


Urine Appearance  Clear  (Clear)  


 


Urine pH  6.5  (5.0-8.0)  


 


Ur Specific Gravity  1.031  (1.001-1.035)  


 


Urine Protein  Negative  (Negative)  


 


Urine Glucose (UA)  4+ H  (Negative)  


 


Urine Ketones  Trace H  (Negative)  


 


Urine Blood  Negative  (Negative)  


 


Urine Nitrite  Negative  (Negative)  


 


Urine Bilirubin  Negative  (Negative)  


 


Urine Urobilinogen  <2.0  (<2.0)  mg/dL


 


Ur Leukocyte Esterase  Negative  (Negative)  














Disposition


Clinical Impression: 


 Internal hernia, Nephrolithiasis, Back pain





Disposition: HOME SELF-CARE


Condition: Stable


Instructions (If sedation given, give patient instructions):  Back Pain (ED)


Additional Instructions: 


Please return to the Emergency Department if symptoms worsen or any other 

concerns.


Prescriptions: 


Ketorolac [Toradol] 10 mg PO Q8HR #15 tab


Is patient prescribed a controlled substance at d/c from ED?: No


Referrals: 


Mekhi Nava DO [Primary Care Provider] - 1-2 days


Clayton Segundo MD [STAFF PHYSICIAN] - 1-2 days


Gian Daniels MD [STAFF PHYSICIAN] - 1-2 days


Time of Disposition: 08:15

## 2022-11-14 NOTE — CT
EXAMINATION TYPE: CT abdomen pelvis w con

 

DATE OF EXAM: 11/14/2022

 

COMPARISON: None.

 

HISTORY: Left flank pain

 

CT DLP: 1229.2 mGycm, Automated Exposure Control for Dose Reduction was Utilized.

 

CONTRAST: 

CT scan of the abdomen and pelvis is performed without oral but with IV Contrast, patient injected wi
th 100 mL of Isovue 300.

 

FINDINGS:

 

LUNG BASES: Partial visualization of sternal wires and epicardial pacer wires. Dependent atelectasis 
in both bases..

 

LIVER/GB: Visualized liver heterogeneously hypodense suggesting diffuse fatty infiltration.

 

PANCREAS:  No significant abnormality is seen.

 

SPLEEN:  No significant abnormality is seen.

 

ADRENALS:  No significant abnormality is seen.

 

KIDNEYS: There are 2 nonobstructing 2 mm calculi upper pole right kidney coronal image 67. There are 
3 left-sided renal calculi measuring up to 6 mm in size midpole level axial image 36. There is symmet
dl cortical injury uptake and excretion without hydronephrosis or obstructing ureter calculi seen bi
laterally. No intraluminal calculi in the poorly distended bladder. Suboptimal evaluation of the blad
mendez due to streak artifact from right hip arthroplasty.

 

BOWEL: Small 1.3 cm duodenal diverticulum along the second portion coronal image 48. No suspicious sm
all or large bowel dilatation.

 

PROSTATE/SEMINAL VESICLES: Enlarged prostate consistent with BPH bulging into the bladder base.

 

LYMPH NODES:  No greater than 1cm abdominal or pelvic lymph nodes are appreciated.

 

OSSEOUS STRUCTURES: Moderate disc space narrowing and vacuum disc phenomenon L2-L3 level. Posterior s
pur effaces the anterior thecal sac at this level sagittal image 71. Mild disc space narrowing and va
cuum disc phenomenon L4-L5 and L5-S1 levels. Bridging anterior in lateral osteophytes in the thoracic
 spine are seen.

 

OTHER: Focal swirling of mesenteric vessels with mild fat stranding right mid abdomen axial images 35
 through 44 for reference. Prominent but subcentimeter lymph nodes at this level are noted. One bowel
 loop is within this area but does not show suspicious dilatation to suggest obstruction.

 

Moderate peripheral calcified plaque of the aorta extends into branch vessels.

 

IMPRESSION: Bilateral nonobstructing nephrolithiasis. No hydronephrosis or obstructing ureteral calcu
li clearly seen bilaterally. No acute findings evident to comp for patient's symptoms of left-sided f
lank pain. Focal swirling of mesenteric vessels with mass effect and mild fat stranding an focal prom
inent but subcentimeter lymph nodes is suggestive of right-sided internal hernia.